# Patient Record
Sex: MALE | URBAN - METROPOLITAN AREA
[De-identification: names, ages, dates, MRNs, and addresses within clinical notes are randomized per-mention and may not be internally consistent; named-entity substitution may affect disease eponyms.]

---

## 2018-09-24 ENCOUNTER — APPOINTMENT (OUTPATIENT)
Age: 38
Setting detail: DERMATOLOGY
End: 2018-09-24

## 2018-09-24 DIAGNOSIS — B36.0 PITYRIASIS VERSICOLOR: ICD-10-CM

## 2018-09-24 PROBLEM — L70.0 ACNE VULGARIS: Status: ACTIVE | Noted: 2018-09-24

## 2018-09-24 PROBLEM — L85.3 XEROSIS CUTIS: Status: ACTIVE | Noted: 2018-09-24

## 2018-09-24 PROCEDURE — ? TREATMENT REGIMEN

## 2018-09-24 PROCEDURE — ? DIAGNOSIS COMMENT

## 2018-09-24 PROCEDURE — ? PRESCRIPTION

## 2018-09-24 PROCEDURE — 99203 OFFICE O/P NEW LOW 30 MIN: CPT

## 2018-09-24 PROCEDURE — ? COUNSELING

## 2018-09-24 RX ORDER — SULFACETAMIDE SODIUM AND SULFUR 10; 5 MG/G; MG/G
RINSE TOPICAL
Qty: 1 | Refills: 4 | Status: ERX | COMMUNITY
Start: 2018-09-24

## 2018-09-24 RX ORDER — FLUCONAZOLE 200 MG/1
TABLET ORAL
Qty: 2 | Refills: 0 | Status: ERX | COMMUNITY
Start: 2018-09-24

## 2018-09-24 RX ADMIN — SULFACETAMIDE SODIUM AND SULFUR: 10; 5 RINSE TOPICAL at 00:00

## 2018-09-24 RX ADMIN — FLUCONAZOLE: 200 TABLET ORAL at 00:00

## 2018-09-24 ASSESSMENT — LOCATION SIMPLE DESCRIPTION DERM
LOCATION SIMPLE: RIGHT CHEEK
LOCATION SIMPLE: CHEST
LOCATION SIMPLE: LEFT CHEEK

## 2018-09-24 ASSESSMENT — LOCATION DETAILED DESCRIPTION DERM
LOCATION DETAILED: LEFT MEDIAL MALAR CHEEK
LOCATION DETAILED: RIGHT MEDIAL MALAR CHEEK
LOCATION DETAILED: STERNUM

## 2018-09-24 ASSESSMENT — LOCATION ZONE DERM
LOCATION ZONE: FACE
LOCATION ZONE: TRUNK

## 2018-09-24 NOTE — PROCEDURE: DIAGNOSIS COMMENT
Comment: TV\\nddx: seb derm vs atypical CARP\\nsulfa wash should help all items on ddx
Detail Level: Generalized

## 2018-11-19 ENCOUNTER — APPOINTMENT (OUTPATIENT)
Age: 38
Setting detail: DERMATOLOGY
End: 2018-11-19

## 2018-11-19 DIAGNOSIS — B36.0 PITYRIASIS VERSICOLOR: ICD-10-CM

## 2018-11-19 PROCEDURE — ? PRESCRIPTION

## 2018-11-19 PROCEDURE — ? COUNSELING

## 2018-11-19 PROCEDURE — ? TREATMENT REGIMEN

## 2018-11-19 PROCEDURE — ? DIAGNOSIS COMMENT

## 2018-11-19 PROCEDURE — 99214 OFFICE O/P EST MOD 30 MIN: CPT

## 2018-11-19 RX ORDER — FLUCONAZOLE 200 MG/1
TABLET ORAL
Qty: 4 | Refills: 0 | Status: ERX | COMMUNITY
Start: 2018-11-19

## 2018-11-19 RX ORDER — PIMECROLIMUS 10 MG/G
CREAM TOPICAL BID
Qty: 1 | Refills: 4 | Status: ERX | COMMUNITY
Start: 2018-11-19

## 2018-11-19 RX ADMIN — PIMECROLIMUS: 10 CREAM TOPICAL at 14:34

## 2018-11-19 RX ADMIN — FLUCONAZOLE: 200 TABLET ORAL at 14:31

## 2018-11-19 ASSESSMENT — LOCATION ZONE DERM
LOCATION ZONE: TRUNK
LOCATION ZONE: FACE

## 2018-11-19 ASSESSMENT — LOCATION SIMPLE DESCRIPTION DERM
LOCATION SIMPLE: CHEST
LOCATION SIMPLE: RIGHT CHEEK
LOCATION SIMPLE: LEFT CHEEK

## 2018-11-19 ASSESSMENT — LOCATION DETAILED DESCRIPTION DERM
LOCATION DETAILED: LEFT MEDIAL MALAR CHEEK
LOCATION DETAILED: STERNUM
LOCATION DETAILED: RIGHT MEDIAL MALAR CHEEK

## 2018-11-19 NOTE — PROCEDURE: TREATMENT REGIMEN
Detail Level: Zone
Initiate Treatment: Elidel cream bid PRN
Continue Regimen: Sulfacetamide sodium-sulfur wash QD \\nResume fluconazole 200mg once weekly x 4 wks

## 2019-05-03 ENCOUNTER — APPOINTMENT (RX ONLY)
Dept: URBAN - METROPOLITAN AREA CLINIC 38 | Facility: CLINIC | Age: 39
Setting detail: DERMATOLOGY
End: 2019-05-03

## 2019-05-03 DIAGNOSIS — L21.8 OTHER SEBORRHEIC DERMATITIS: ICD-10-CM

## 2019-05-03 DIAGNOSIS — L70.0 ACNE VULGARIS: ICD-10-CM

## 2019-05-03 PROCEDURE — 99202 OFFICE O/P NEW SF 15 MIN: CPT

## 2019-05-03 RX ORDER — ADAPALENE AND BENZOYL PEROXIDE 3; 25 MG/G; MG/G
GEL TOPICAL
Qty: 1 | Refills: 2 | Status: ERX | COMMUNITY
Start: 2019-05-03

## 2019-05-03 RX ORDER — HYDROCORTISONE 25 MG/G
OINTMENT TOPICAL
Qty: 1 | Refills: 1 | Status: ERX | COMMUNITY
Start: 2019-05-03

## 2019-05-03 RX ORDER — ADAPALENE AND BENZOYL PEROXIDE 3; 25 MG/G; MG/G
GEL TOPICAL
Qty: 1 | Refills: 2 | Status: ERX

## 2019-05-03 RX ADMIN — HYDROCORTISONE: 25 OINTMENT TOPICAL at 15:41

## 2019-05-03 RX ADMIN — ADAPALENE AND BENZOYL PEROXIDE: 3; 25 GEL TOPICAL at 15:43
